# Patient Record
Sex: MALE | Race: OTHER | NOT HISPANIC OR LATINO
[De-identification: names, ages, dates, MRNs, and addresses within clinical notes are randomized per-mention and may not be internally consistent; named-entity substitution may affect disease eponyms.]

---

## 2022-02-11 PROBLEM — Z00.00 ENCOUNTER FOR PREVENTIVE HEALTH EXAMINATION: Status: ACTIVE | Noted: 2022-02-11

## 2022-02-14 ENCOUNTER — APPOINTMENT (OUTPATIENT)
Dept: OTOLARYNGOLOGY | Facility: CLINIC | Age: 27
End: 2022-02-14

## 2022-02-23 ENCOUNTER — APPOINTMENT (OUTPATIENT)
Dept: OTOLARYNGOLOGY | Facility: CLINIC | Age: 27
End: 2022-02-23
Payer: COMMERCIAL

## 2022-02-23 VITALS — BODY MASS INDEX: 19.99 KG/M2 | WEIGHT: 120 LBS | HEIGHT: 65 IN | TEMPERATURE: 96.2 F

## 2022-02-23 DIAGNOSIS — Z78.9 OTHER SPECIFIED HEALTH STATUS: ICD-10-CM

## 2022-02-23 DIAGNOSIS — H90.A31 MIXED CONDUCTIVE AND SENSORINEURAL HEARING LOSS, UNILATERAL, RIGHT EAR WITH RESTRICTED HEARING ON THE  CONTRALATERAL SIDE: ICD-10-CM

## 2022-02-23 DIAGNOSIS — H90.8 MIXED CONDUCTIVE AND SENSORINEURAL HEARING LOSS, UNSPECIFIED: ICD-10-CM

## 2022-02-23 DIAGNOSIS — H61.23 IMPACTED CERUMEN, BILATERAL: ICD-10-CM

## 2022-02-23 PROCEDURE — 69210 REMOVE IMPACTED EAR WAX UNI: CPT

## 2022-02-23 PROCEDURE — 99204 OFFICE O/P NEW MOD 45 MIN: CPT | Mod: 25

## 2022-02-23 PROCEDURE — 92567 TYMPANOMETRY: CPT

## 2022-02-23 PROCEDURE — 92557 COMPREHENSIVE HEARING TEST: CPT

## 2022-02-23 NOTE — ASSESSMENT
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 This is causing proceeds hearing loss and recurrent cerumen retention. I have reviewed management options with the patient in detail. He wished to proceed with surgical intervention.All risks limitations, complications and alternatives reviewed in detail.  Questions answered.\par \par Significant high-frequency hearing loss is present bilaterally.I have discussed this with the patient in detail. This may represent noise induced hearing loss related to previous work in a noisy environment. I have recommended ear protection. Continued clinical monitoring with audiometry is recommended.\par \par \par Surgical plan: Right ear canal stenosis repair

## 2022-02-23 NOTE — HISTORY OF PRESENT ILLNESS
[de-identified] : Referred by ENT colleague for subspecialty evaluation.\jarrett QUESADA has a history of right ear fullness diagnosed with osteoma.  This has caused recurrent ear fullness and cerumen retention. No reported ear infections.  There is a complaint of hearing loss and tinnitus in the right ear.

## 2022-02-23 NOTE — CONSULT LETTER
[Please see my note below.] : Please see my note below. [FreeTextEntry2] : Dr. Ozzy Carbajal\par  [FreeTextEntry1] : Thank you for allowing me to participate in the care of SWETHA QUESADA .\par Please see the attached visit note.\par \par \par \par Indra Watson\par Otology\par Medical Director of [facility]\par Department of Otolaryngology\par [facility]

## 2022-02-23 NOTE — PHYSICAL EXAM
[Midline] : trachea located in midline position [Normal] : no rashes [FreeTextEntry1] : Microscopic ear exam with cerumen debridement:\par \par Right ear: Obstructing cerumen was debrided from the ear canal using suction, and curet.  Nearly complete obstruction of the ear canal due to an anteriorly-based bony, mass. The deeper ear canal could not be visualized. The tympanic membrane cannot be visualized. \par \par Left ear: Obstructing cerumen was debrided from the ear canal using suction, and curets.  The ear canal was otherwise within normal limits.  The tympanic membrane was intact and noninflamed.\par

## 2022-02-23 NOTE — DATA REVIEWED
[de-identified] : In order to investigate current symptoms, Complete audiometry was ordered and completed today. I have interpreted these results and reviewed them in detail with the patient.\par \par Low-frequency hearing loss in the right ear likely related to osteoma. Bilateral high-frequency sensorineural hearing loss is present. This was reviewed with the patient in detail. [de-identified] : CT scan images, and report reviewed in detail.

## 2022-03-08 ENCOUNTER — LABORATORY RESULT (OUTPATIENT)
Age: 27
End: 2022-03-08

## 2022-03-10 ENCOUNTER — TRANSCRIPTION ENCOUNTER (OUTPATIENT)
Age: 27
End: 2022-03-10

## 2022-03-11 ENCOUNTER — OUTPATIENT (OUTPATIENT)
Dept: OUTPATIENT SERVICES | Facility: HOSPITAL | Age: 27
LOS: 1 days | Discharge: HOME | End: 2022-03-11
Payer: COMMERCIAL

## 2022-03-11 ENCOUNTER — RESULT REVIEW (OUTPATIENT)
Age: 27
End: 2022-03-11

## 2022-03-11 ENCOUNTER — APPOINTMENT (OUTPATIENT)
Dept: OTOLARYNGOLOGY | Facility: AMBULATORY SURGERY CENTER | Age: 27
End: 2022-03-11

## 2022-03-11 VITALS
DIASTOLIC BLOOD PRESSURE: 734 MMHG | RESPIRATION RATE: 16 BRPM | HEART RATE: 66 BPM | SYSTOLIC BLOOD PRESSURE: 122 MMHG | OXYGEN SATURATION: 100 %

## 2022-03-11 VITALS
OXYGEN SATURATION: 99 % | WEIGHT: 117.51 LBS | RESPIRATION RATE: 16 BRPM | HEIGHT: 65 IN | TEMPERATURE: 99 F | SYSTOLIC BLOOD PRESSURE: 121 MMHG | DIASTOLIC BLOOD PRESSURE: 56 MMHG | HEART RATE: 61 BPM

## 2022-03-11 PROCEDURE — 69140 REMOVE EAR CANAL LESION(S): CPT | Mod: RT

## 2022-03-11 PROCEDURE — 88304 TISSUE EXAM BY PATHOLOGIST: CPT | Mod: 26

## 2022-03-11 PROCEDURE — 95867 NDL EMG CRANIAL NRV MUSC UNI: CPT | Mod: 26

## 2022-03-11 DEVICE — FIBER BEAM PATH OTO-S: Type: IMPLANTABLE DEVICE | Site: RIGHT | Status: FUNCTIONAL

## 2022-03-11 DEVICE — BONE WAX 2.5GM: Type: IMPLANTABLE DEVICE | Site: RIGHT | Status: FUNCTIONAL

## 2022-03-11 DEVICE — SURGICEL 4 X 8": Type: IMPLANTABLE DEVICE | Site: RIGHT | Status: FUNCTIONAL

## 2022-03-11 RX ORDER — FENTANYL CITRATE 50 UG/ML
25 INJECTION INTRAVENOUS
Refills: 0 | Status: DISCONTINUED | OUTPATIENT
Start: 2022-03-11 | End: 2022-03-11

## 2022-03-11 RX ORDER — SODIUM CHLORIDE 9 MG/ML
1000 INJECTION, SOLUTION INTRAVENOUS
Refills: 0 | Status: DISCONTINUED | OUTPATIENT
Start: 2022-03-11 | End: 2022-03-11

## 2022-03-11 RX ORDER — ONDANSETRON 8 MG/1
4 TABLET, FILM COATED ORAL ONCE
Refills: 0 | Status: DISCONTINUED | OUTPATIENT
Start: 2022-03-11 | End: 2022-03-11

## 2022-03-11 RX ORDER — APREPITANT 80 MG/1
40 CAPSULE ORAL ONCE
Refills: 0 | Status: COMPLETED | OUTPATIENT
Start: 2022-03-11 | End: 2022-03-11

## 2022-03-11 RX ORDER — ACETAMINOPHEN 500 MG
650 TABLET ORAL ONCE
Refills: 0 | Status: COMPLETED | OUTPATIENT
Start: 2022-03-11 | End: 2022-03-11

## 2022-03-11 RX ADMIN — Medication 650 MILLIGRAM(S): at 08:38

## 2022-03-11 RX ADMIN — APREPITANT 40 MILLIGRAM(S): 80 CAPSULE ORAL at 08:38

## 2022-03-11 NOTE — ASU PATIENT PROFILE, ADULT - FALL HARM RISK - UNIVERSAL INTERVENTIONS
Bed in lowest position, wheels locked, appropriate side rails in place/Call bell, personal items and telephone in reach/Instruct patient to call for assistance before getting out of bed or chair/Non-slip footwear when patient is out of bed/[place] to call system/Physically safe environment - no spills, clutter or unnecessary equipment/Purposeful Proactive Rounding/Room/bathroom lighting operational, light cord in reach

## 2022-03-11 NOTE — ASU DISCHARGE PLAN (ADULT/PEDIATRIC) - NS MD DC FALL RISK RISK
For information on Fall & Injury Prevention, visit: [email or web address] OR  [email or web address] OR  https://www.cdc.gov/steadi/patient.html

## 2022-03-11 NOTE — BRIEF OPERATIVE NOTE - NSICDXBRIEFPROCEDURE_GEN_ALL_CORE_FT
PROCEDURES:  Excision, exostosis, ear canal 11-Mar-2022 11:50:49  Indra Watson  Electromyography (EMG) of unilateral facial muscle 11-Mar-2022 11:51:12  Indra Watson

## 2022-03-21 ENCOUNTER — APPOINTMENT (OUTPATIENT)
Dept: OTOLARYNGOLOGY | Facility: CLINIC | Age: 27
End: 2022-03-21
Payer: COMMERCIAL

## 2022-03-21 VITALS — BODY MASS INDEX: 20.83 KG/M2 | HEIGHT: 65 IN | WEIGHT: 125 LBS

## 2022-03-21 DIAGNOSIS — D16.4 BENIGN NEOPLASM OF BONES OF SKULL AND FACE: ICD-10-CM

## 2022-03-21 DIAGNOSIS — H90.A22 SENSORINEURAL HEARING LOSS, UNILATERAL, LEFT EAR, WITH RESTRICTED HEARING ON THE CONTRALATERAL SIDE: ICD-10-CM

## 2022-03-21 PROBLEM — H61.301: Chronic | Status: ACTIVE | Noted: 2022-03-11

## 2022-03-21 PROCEDURE — 99024 POSTOP FOLLOW-UP VISIT: CPT

## 2022-03-21 PROCEDURE — 92567 TYMPANOMETRY: CPT

## 2022-03-21 PROCEDURE — 92557 COMPREHENSIVE HEARING TEST: CPT

## 2022-03-21 NOTE — HISTORY OF PRESENT ILLNESS
[de-identified] : Referred by ENT colleague for subspecialty evaluation.\jarrett QUESADA has a history of right ear fullness diagnosed with osteoma.  This has caused recurrent ear fullness and cerumen retention. No reported ear infections.  There is a complaint of hearing loss and tinnitus in the right ear.  [FreeTextEntry1] : 03/21/2022 \par History\par Post operative visit.\par Reports No pain or otorrhea. Compliant with wound care\par \par Physical Exam\par \par Microscopic Ear Exam\par Right Ear: No significant inflammation noted.  The tympanic membrane appears intact.\par \par Complete audiometry was ordered and completed today. This was separately reported by the audiologist. The results were reviewed in detail with the patient.\par \par \par \par Postoperative instructions reviewed with the patient in detail. Followup plans discussed.

## 2022-09-19 ENCOUNTER — APPOINTMENT (OUTPATIENT)
Dept: OTOLARYNGOLOGY | Facility: CLINIC | Age: 27
End: 2022-09-19

## (undated) DEVICE — BLADE TYMPANOPLASTY 2.5MM W 60 DEGREE BEVEL DOWN

## (undated) DEVICE — SUT VICRYL 4-0 27" PS-2 UNDYED

## (undated) DEVICE — DRSG XEROFORM 1 X 8"

## (undated) DEVICE — PREP BETADINE SPONGE STICKS

## (undated) DEVICE — SUT VICRYL 3-0 18" PS-2 UNDYED

## (undated) DEVICE — DRSG TEGADERM 2.5X3"

## (undated) DEVICE — BUR STRYKER DIAMOND ROUND 3MM

## (undated) DEVICE — PACKING NASAL STD 1.5X4.5X2CM

## (undated) DEVICE — BUR STRYKER FLUTED ROUND SOFT TOUCH 3MM

## (undated) DEVICE — SUT PLAIN GUT FAST ABSORBING 5-0 PC-1

## (undated) DEVICE — BUR STRYKER DIAMOND ROUND 5MM

## (undated) DEVICE — DRSG GLASSOCK ADULT

## (undated) DEVICE — DRSG GAUZE SPONGE 2X2" STERILE

## (undated) DEVICE — STRYKER CORE IRRIGATION DRILL CASSETTE DISP

## (undated) DEVICE — DRAPE MICROSCOPE ZEISS

## (undated) DEVICE — PETRI DISH MED 3.5"

## (undated) DEVICE — DRAPE GLASSCOCK OTOLOGY SURG

## (undated) DEVICE — GOWN SLEEVES

## (undated) DEVICE — ELCTR NDL SUBDERMAL 2 CHANNEL

## (undated) DEVICE — PUNCH BIOPSY 5MM DISP

## (undated) DEVICE — PACK TYMPANOMASTOIDECTOMY LF

## (undated) DEVICE — PREP ALCOHOL PAD MED

## (undated) DEVICE — DRSG PACKING EAR AMBRUS 15X20MM

## (undated) DEVICE — DRSG MASTISOL

## (undated) DEVICE — BUR STRYKER DIAMOND ROUND 4MM

## (undated) DEVICE — BUR STRYKER FLUTED ROUND SOFT TOUCH 5MM

## (undated) DEVICE — DRAPE APERTURE

## (undated) DEVICE — NDL SPINAL 25G X 3.5" (BLUE)

## (undated) DEVICE — BEAVER BLADE MIN-BLADE ROUNDED TIP 1-SIDE SHARP (GREEN)

## (undated) DEVICE — DRAPE VARI-LENS2 MICROSCPOPE 68MM

## (undated) DEVICE — GLV 7.5 PROTEXIS (WHITE)

## (undated) DEVICE — DRSG PACKING NASAL DEROYAL COTTON STRIP

## (undated) DEVICE — DRSG KIT EAR GLASSCK PEDS

## (undated) DEVICE — SLV COMPRESSION KNEE MED

## (undated) DEVICE — BUR STRYKER FLUTED ROUND SOFT TOUCH 4MM

## (undated) DEVICE — WARMING BLANKET LOWER ADULT

## (undated) DEVICE — ELCTR MONOPOLAR STIMULATOR PROBE FLUSH-TIP

## (undated) DEVICE — TUBING OTOMED SUCTION

## (undated) DEVICE — DRAIN PENROSE 1" X 18" LATEX